# Patient Record
Sex: MALE | Race: WHITE | NOT HISPANIC OR LATINO | URBAN - METROPOLITAN AREA
[De-identification: names, ages, dates, MRNs, and addresses within clinical notes are randomized per-mention and may not be internally consistent; named-entity substitution may affect disease eponyms.]

---

## 2017-12-06 ENCOUNTER — ALLSCRIPTS OFFICE VISIT (OUTPATIENT)
Dept: OTHER | Facility: OTHER | Age: 57
End: 2017-12-06

## 2017-12-06 DIAGNOSIS — I10 ESSENTIAL (PRIMARY) HYPERTENSION: ICD-10-CM

## 2017-12-07 NOTE — PROGRESS NOTES
Assessment  Assessed    1  3-vessel CAD (414 00) (I25 10)   2  CABG   3  Hypertension (401 9) (I10)    Plan  Hyperlipidemia, Hypertension    · EKG/ECG- POC; Status:Complete;   Done: 90ZUP7653 09:12AM   Perform: In Office; Last Updated By:Manuela Harris; 2017 9:12:51 AM;Ordered;For:Hyperlipidemia, Hypertension; Ordered By:Rajesh Krishan; Hypertension    · Metoprolol Tartrate 25 MG Oral Tablet   Rx By: Cierra Palomino; Dispense: 30 Days ; #:180 Tablet; Refill: 2;For: Hypertension; XIOMY = N; Sent To: [facility]/PHARMACY #[id number]  · Carvedilol 12 5 MG Oral Tablet; TAKE 1 TABLET TWICE DAILY WITH MEALS   Rx By: Cierra Palomino; Dispense: 0 Days ; #:60 Tablet; Refill: 3;For: Hypertension; XIOMY = N; Verified Transmission to [facility]/PHARMACY #[id number] Last Updated By: System, SureScripts; 2017 9:40:33 AM   · HydroCHLOROthiazide 25 MG Oral Tablet; TAKE 1 TABLET DAILY   Rx By: Cierra Palomino; Dispense: 90 Days ; #:90 Tablet; Refill: 3;For: Hypertension; XIOMY = N; Record   · (1) COMPREHENSIVE METABOLIC PANEL; Status:Active; Requested for:22Pcy1946;    Perform:[facility]; Due:64Qzl7016; Ordered;Ordered By:Rajesh Krishna;   · (1) LIPID PANEL FASTING W DIRECT LDL REFLEX; Status:Active; Requestedfor:10Iri4609;    Perform:[facility]; [id number]; Ordered;Ordered By:Rajesh Krishna;   · Follow-up visit in 6 months Evaluation and Treatment  Follow-up  Status: Complete Done: 23PBY3387   Ordered; Hypertension; Ordered By: Cierra Palomino Performed:  Order Comments: put on w/l Due: 77FNR3523; Last Updated By: Inés Finn; 2017 9:32:07 AM    Discussion/Summary  Cardiology Discussion Summary Free Text Note Form [facility]:   1  Coronary Artery Disease s/p CABG: Overall doing well  Asymptomatic  Hypertension: BP running high  switch metoprolol to carvedilol  Dyslipidemia: Continue Atorvastatin     Counseling Documentation With Imm: The patient was counseled regarding diagnostic results,-- instructions for management,-- risk factor reductions,-- impressions  total time of encounter was 25 minutes-- and-- 15 minutes was spent counseling  Chief Complaint  Chief Complaint Free Text Note Form: yearly ov      History of Present Illness  Cardiology HPI Free Text Note Form [facility]: followup for cad  well  no chest pain, no dyspnea, no palpitations   Coronary Artery Disease (Follow-Up): The patient states he has been stable with his coronary artery disease symptoms since the last visit  Symptoms: denies chest pain when at rest,-- denies exertional chest pain,-- denies dyspnea,-- denies fatigue-- and-- denies exercise intolerance  Associated symptoms include no syncope,-- no palpitations,-- no PND,-- no orthopnea-- and-- no edema  He denies nitroglycerin use since the last visit  Review of Systems  Cardiology Male ROS:    Cardiac: no chest pain,-- no rhythm problems,-- no fainting/blackouts,-- no heart murmur present,-- no signs of swelling-- and-- no palpitations present  Skin: No complaints of nonhealing sores or skin rash  Genitourinary: No complaints of recurrent urinary tract infections, frequent urination at night, difficult urination, blood in urine, kidney stones, loss of bladder control, no kidney or prostate problems, no erectile dysfunction  Psychological: No complaints of feeling depressed, anxiety, panic attacks, or difficulty concentrating  General: No complaints of trouble sleeping, lack of energy, fatigue, appetite changes, weight changes, fever, frequent infections, or night sweats  Respiratory: No complaints of shortness of breath, cough with sputum, or wheezing  HEENT: No complaints of serious problems, hearing problems, nose problems, throat problems, or snoring  Gastrointestinal: No complaints of liver problems, nausea, vomiting, heartburn, constipation, bloody stools, diarrhea, problems swallowing, adbominal pain, or rectal bleeding    Hematologic: No complaints of bleeding disorders, anemia, blood clots, or excessive brusing  Neurological: No complaints of numbness, tingling, dizziness, weakness, seizures, headaches, syncope or fainting, AM fatigue, daytime sleepiness, no witnessed apnea episodes  Musculoskeletal: No complaints of arthritis, back pain, or painfull swelling  ROS Reviewed:   ROS reviewed  Active Problems  Problems    1  3-vessel CAD (414 00) (I25 10)   2  CABG   3  Cancer, colon (153 9) (C18 9)   4  Cerebrovascular accident, embolic (300 97) (Y37 8)   5  Hyperkalemia (276 7) (E87 5)   6  Hyperlipidemia (272 4) (E78 5)   7  Hypertension (401 9) (I10)    Past Medical History  Problems    1  History of myocardial infarction (412) (I25 2)  Active Problems And Past Medical History Reviewed: The active problems and past medical history were reviewed and updated today  Surgical History  Problems    1  History of CABG   2  CABG  Surgical History Reviewed: The surgical history was reviewed and updated today  Family History  Mother    1  Family history of    2  Family history of myocardial infarction (V17 3) (Z82 49)  Father    3  Family history of    4  Family history of cerebrovascular accident (V17 1) (Z82 3)  Family History Reviewed: The family history was reviewed and updated today  Social History  Problems    · Attends alcoholics anonymous meetings   · 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)   · Drug use (305 90) (F19 90)   · Never a smoker  Social History Reviewed: The social history was reviewed and updated today  Current Meds   1  Aspirin 81 MG TABS; TAKE 1 TABLET DAILY; Therapy: 48Hsk5149 to [phone])  Requested for: 74Pwz7341; Last Rx:79Msd4186 Ordered   2  Atorvastatin Calcium 80 MG Oral Tablet; TAKE 1 TABLET DAILY; Therapy: 72Uzy1870 to (Evaluate:08Xyw5945)  Requested for: 64Uyk2626; Last Rx:95Eux4557 Ordered   3  Candesartan Cilexetil 16 MG Oral Tablet; TAKE 1 TABLET ONCE DAILY; Therapy: 80Rkd0429 to  Requested for: 68UGY6057 Recorded   4   Centrum Silver TABS; Therapy: (Recorded:19Yei9635) to Recorded   5  HydroCHLOROthiazide 25 MG Oral Tablet; TAKE 1 TABLET DAILY; Therapy: 58OLX3161 to (Evaluate:97Lsh0168); Last Rx:96Hym9500 Ordered   6  Metoprolol Tartrate 25 MG Oral Tablet; TAKE 1 TABLET TWICE DAILY; Therapy: 97Rcy9548 to (Evaluate:90Qhs2537)  Requested for: 00Wxk7542; Last Rx:22Dev0350 Ordered   7  Nitrostat 0 4 MG Sublingual Tablet Sublingual; DISSOLVE 1 TABLET UNDER THE TONGUE AS NEEDED FOR CHEST PAIN; Therapy: 68ZLH4403 to (Evaluate:36Dkl8594)  Requested for: 96EXR5443; Last Rx:13Oct2014 Ordered  Medication List Reviewed: The medication list was reviewed and updated today  Allergies  Medication    1  Penicillins    Vitals  Vital Signs    Recorded: 92VUI8180 09:15AM   Heart Rate 44   Systolic 385   Diastolic 84   Weight 694 lb 8 oz   BMI Calculated 33 45   BSA Calculated 2 18       Physical Exam   Constitutional  General appearance: No acute distress, well appearing and well nourished  Eyes  Conjunctiva and Sclera examination: Conjunctiva pink, sclera anicteric  Ears, Nose, Mouth, and Throat - Oropharynx: Clear, nares are clear, mucous membranes are moist   Neck  Neck and thyroid: Normal, supple, trachea midline, no thyromegaly  Pulmonary  Respiratory effort: No increased work of breathing or signs of respiratory distress  Auscultation of lungs: Clear to auscultation, no rales, no rhonchi, no wheezing, good air movement  Cardiovascular  Auscultation of heart: Normal rate and rhythm, normal S1 and S2, no murmurs  Carotid pulses: Normal, 2+ bilaterally  Peripheral vascular exam: Normal pulses throughout, no tenderness, erythema or swelling  Pedal pulses: Normal, 2+ bilaterally  Examination of extremities for edema and/or varicosities: Normal    Abdomen  Abdomen: Non-tender and no distention  Liver and spleen: No hepatomegaly or splenomegaly  Musculoskeletal Gait and station: Normal gait  -- Digits and nails: Normal without clubbing or cyanosis  -- Inspection/palpation of joints, bones, and muscles: Normal, ROM normal    Skin - Skin and subcutaneous tissue: Normal without rashes or lesions  Skin is warm and well perfused, normal turgor  Neurologic - Speech: Normal    Psychiatric - Orientation to person, place, and time: Normal -- Mood and affect: Normal       Results/Data  ECG Report:  Rhythm and rate: sinus bradycardia--   ventricular rate is 44 beats per minute    ST segment: the ST segments are normal       Signatures   Electronically signed by : BIB Dalton ; Dec  6 2017  7:49PM EST                       (Author)

## 2018-01-22 VITALS
SYSTOLIC BLOOD PRESSURE: 148 MMHG | WEIGHT: 226.5 LBS | BODY MASS INDEX: 33.45 KG/M2 | DIASTOLIC BLOOD PRESSURE: 84 MMHG | HEART RATE: 44 BPM

## 2018-01-23 NOTE — CONSULTS
Chief Complaint  yearly ov      History of Present Illness  followup for cad    doing well  no chest pain, no dyspnea, no palpitations   The patient states he has been stable with his coronary artery disease symptoms since the last visit  Symptoms: denies chest pain when at rest, denies exertional chest pain, denies dyspnea, denies fatigue and denies exercise intolerance  Associated symptoms include no syncope, no palpitations, no PND, no orthopnea and no edema  He denies nitroglycerin use since the last visit  Review of Systems      Cardiac: no chest pain, no rhythm problems, no fainting/blackouts, no heart murmur present, no signs of swelling and no palpitations present  Skin: No complaints of nonhealing sores or skin rash  Genitourinary: No complaints of recurrent urinary tract infections, frequent urination at night, difficult urination, blood in urine, kidney stones, loss of bladder control, no kidney or prostate problems, no erectile dysfunction  Psychological: No complaints of feeling depressed, anxiety, panic attacks, or difficulty concentrating  General: No complaints of trouble sleeping, lack of energy, fatigue, appetite changes, weight changes, fever, frequent infections, or night sweats  Respiratory: No complaints of shortness of breath, cough with sputum, or wheezing  HEENT: No complaints of serious problems, hearing problems, nose problems, throat problems, or snoring  Gastrointestinal: No complaints of liver problems, nausea, vomiting, heartburn, constipation, bloody stools, diarrhea, problems swallowing, adbominal pain, or rectal bleeding  Hematologic: No complaints of bleeding disorders, anemia, blood clots, or excessive brusing  Neurological: No complaints of numbness, tingling, dizziness, weakness, seizures, headaches, syncope or fainting, AM fatigue, daytime sleepiness, no witnessed apnea episodes     Musculoskeletal: No complaints of arthritis, back pain, or painfull swelling  ROS reviewed  Active Problems    1  3-vessel CAD (414 00) (I25 10)   2  CABG   3  Cancer, colon (153 9) (C18 9)   4  Cerebrovascular accident, embolic (484 51) (X42 3)   5  Hyperkalemia (276 7) (E87 5)   6  Hyperlipidemia (272 4) (E78 5)   7  Hypertension (401 9) (I10)    Past Medical History    · History of myocardial infarction (412) (I25 2)    The active problems and past medical history were reviewed and updated today  Surgical History    · History of CABG   · CABG    The surgical history was reviewed and updated today  Family History    · Family history of    · Family history of myocardial infarction (V17 3) (Z82 49)    · Family history of    · Family history of cerebrovascular accident (V17 1) (Z82 3)    The family history was reviewed and updated today  Social History    · Attends alcoholics anonymous meetings   · 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)   · Drug use (305 90) (F19 90)   · Never a smoker  The social history was reviewed and updated today  Current Meds   1  Aspirin 81 MG TABS; TAKE 1 TABLET DAILY; Therapy: 68Ynj9766 to (96 418998)  Requested for: 37Hcj1392; Last   Rx:61Ufo8436 Ordered   2  Atorvastatin Calcium 80 MG Oral Tablet; TAKE 1 TABLET DAILY; Therapy: 92Nld5453 to (Evaluate:44Hpd5503)  Requested for: 95Ost1838; Last   Rx:00Jvq0937 Ordered   3  Candesartan Cilexetil 16 MG Oral Tablet; TAKE 1 TABLET ONCE DAILY; Therapy: 09Bah8888 to  Requested for: 54GWO6011 Recorded   4  Centrum Silver TABS; Therapy: (Recorded:85Ajc5516) to Recorded   5  HydroCHLOROthiazide 25 MG Oral Tablet; TAKE 1 TABLET DAILY; Therapy: 08SQG4107 to (Evaluate:30Bqq0927); Last Rx:90Chs0931 Ordered   6  Metoprolol Tartrate 25 MG Oral Tablet; TAKE 1 TABLET TWICE DAILY; Therapy: 15Nlt3194 to (Evaluate:2016)  Requested for: 25Ypd3969; Last   Rx:54Dcf4874 Ordered   7   Nitrostat 0 4 MG Sublingual Tablet Sublingual; DISSOLVE 1 TABLET UNDER THE   TONGUE AS NEEDED FOR CHEST PAIN;   Therapy: 15DKJ6164 to (Evaluate:19Xzo1251)  Requested for: 14KOR0395; Last   Rx:13Oct2014 Ordered    The medication list was reviewed and updated today  Allergies    1  Penicillins    Vitals   Recorded: 09AGX7674 09:15AM   Heart Rate 44   Systolic 566   Diastolic 84   Weight 717 lb 8 oz   BMI Calculated 33 45   BSA Calculated 2 18     Physical Exam    Constitutional   General appearance: No acute distress, well appearing and well nourished  Eyes   Conjunctiva and Sclera examination: Conjunctiva pink, sclera anicteric  Ears, Nose, Mouth, and Throat - Oropharynx: Clear, nares are clear, mucous membranes are moist    Neck   Neck and thyroid: Normal, supple, trachea midline, no thyromegaly  Pulmonary   Respiratory effort: No increased work of breathing or signs of respiratory distress  Auscultation of lungs: Clear to auscultation, no rales, no rhonchi, no wheezing, good air movement  Cardiovascular   Auscultation of heart: Normal rate and rhythm, normal S1 and S2, no murmurs  Carotid pulses: Normal, 2+ bilaterally  Peripheral vascular exam: Normal pulses throughout, no tenderness, erythema or swelling  Pedal pulses: Normal, 2+ bilaterally  Examination of extremities for edema and/or varicosities: Normal     Abdomen   Abdomen: Non-tender and no distention  Liver and spleen: No hepatomegaly or splenomegaly  Musculoskeletal Gait and station: Normal gait  Digits and nails: Normal without clubbing or cyanosis  Inspection/palpation of joints, bones, and muscles: Normal, ROM normal     Skin - Skin and subcutaneous tissue: Normal without rashes or lesions  Skin is warm and well perfused, normal turgor  Neurologic - Speech: Normal     Psychiatric - Orientation to person, place, and time: Normal  Mood and affect: Normal       Results/Data    Rhythm and rate: sinus bradycardia   ventricular rate is 44 beats per minute     ST segment: the ST segments are normal  Assessment    1  3-vessel CAD (414 00) (I25 10)   2  CABG   3  Hypertension (401 9) (I10)    Plan  Hyperlipidemia, Hypertension    · EKG/ECG- POC; Status:Complete;   Done: 17UNH0863 09:12AM   Perform: In Office; Last Updated By:Manuela Harris; 12/6/2017 9:12:51 AM;Ordered;  For:Hyperlipidemia, Hypertension; Ordered By:Joel Krishna; Hypertension    · Metoprolol Tartrate 25 MG Oral Tablet   Rx By: Laney Moser; Dispense: 30 Days ; #:180 Tablet; Refill: 2; For: Hypertension; XIOMY = N; Sent To: [facility]/PHARMACY #1341  · Carvedilol 12 5 MG Oral Tablet; TAKE 1 TABLET TWICE DAILY WITH MEALS   Rx By: Laney Moser; Dispense: 0 Days ; #:60 Tablet; Refill: 3; For: Hypertension; XIOMY = N; Verified Transmission to [facility]/PHARMACY #0354 Last Updated By: System, SureScripts; 12/6/2017 9:40:33 AM   · HydroCHLOROthiazide 25 MG Oral Tablet; TAKE 1 TABLET DAILY   Rx By: Laney Moser; Dispense: 90 Days ; #:90 Tablet; Refill: 3; For: Hypertension; XIOMY = N; Record   · (1) COMPREHENSIVE METABOLIC PANEL; Status:Active; Requested for:18Xeo3686;    Perform:[facility]; Due:72Hea1182; Ordered;  For:Hypertension; Ordered By:Khris Krishna;   · (1) LIPID PANEL FASTING W DIRECT LDL REFLEX; Status:Active; Requested  for:51Hzc9984;    Perform:[facility]; BIW:02PKU8480; Ordered;  For:Hypertension; Ordered By:Khris Krishna;   · Follow-up visit in 6 months Evaluation and Treatment  Follow-up  Status: Complete   Done: 66GSE5019   Ordered; For: Hypertension; Ordered By: Laney Mosre Performed:  Order Comments: put on w/l Due: 83SSA5872; Last Updated By: Jerilyn Bonner; 12/6/2017 9:32:07 AM    Discussion/Summary    1  Coronary Artery Disease s/p CABG: Overall doing well  Asymptomatic  2  Hypertension: BP running high  switch metoprolol to carvedilol    3  Dyslipidemia: Continue Atorvastatin  The patient was counseled regarding diagnostic results, instructions for management, risk factor reductions, impressions   total time of encounter was 25 minutes and 15 minutes was spent counseling        Signatures   Electronically signed by : BIB Drake ; Dec  6 2017  7:49PM EST                       (Author)

## 2018-03-30 DIAGNOSIS — I10 ESSENTIAL HYPERTENSION: Primary | ICD-10-CM

## 2018-03-30 RX ORDER — CARVEDILOL 12.5 MG/1
TABLET ORAL
Qty: 60 TABLET | Refills: 3 | Status: SHIPPED | OUTPATIENT
Start: 2018-03-30 | End: 2018-07-16 | Stop reason: SDUPTHER

## 2018-07-16 DIAGNOSIS — I10 ESSENTIAL HYPERTENSION: ICD-10-CM

## 2018-07-16 RX ORDER — CARVEDILOL 12.5 MG/1
TABLET ORAL
Qty: 60 TABLET | Refills: 3 | Status: SHIPPED | OUTPATIENT
Start: 2018-07-16

## 2018-12-06 DIAGNOSIS — I10 ESSENTIAL HYPERTENSION: Primary | ICD-10-CM

## 2018-12-06 RX ORDER — HYDROCHLOROTHIAZIDE 25 MG/1
TABLET ORAL
Qty: 90 TABLET | Refills: 3 | Status: SHIPPED | OUTPATIENT
Start: 2018-12-06 | End: 2019-12-08 | Stop reason: SDUPTHER

## 2019-12-08 DIAGNOSIS — I10 ESSENTIAL HYPERTENSION: ICD-10-CM

## 2019-12-09 RX ORDER — HYDROCHLOROTHIAZIDE 25 MG/1
TABLET ORAL
Qty: 90 TABLET | Refills: 3 | Status: SHIPPED | OUTPATIENT
Start: 2019-12-09

## 2024-03-05 ENCOUNTER — HOSPITAL ENCOUNTER (EMERGENCY)
Facility: HOSPITAL | Age: 64
Discharge: HOME | End: 2024-03-05
Attending: EMERGENCY MEDICINE
Payer: COMMERCIAL

## 2024-03-05 VITALS
DIASTOLIC BLOOD PRESSURE: 78 MMHG | HEART RATE: 47 BPM | RESPIRATION RATE: 12 BRPM | BODY MASS INDEX: 31.04 KG/M2 | SYSTOLIC BLOOD PRESSURE: 150 MMHG | OXYGEN SATURATION: 97 % | WEIGHT: 210.2 LBS | TEMPERATURE: 98.4 F

## 2024-03-05 DIAGNOSIS — I10 ASYMPTOMATIC HYPERTENSION: Primary | ICD-10-CM

## 2024-03-05 LAB
ANION GAP SERPL CALCULATED.3IONS-SCNC: 5 MMOL/L
BASOPHILS # BLD AUTO: 0.04 THOUSANDS/ÂΜL (ref 0–0.1)
BASOPHILS NFR BLD AUTO: 1 % (ref 0–1)
BUN SERPL-MCNC: 14 MG/DL (ref 5–25)
CALCIUM SERPL-MCNC: 9.5 MG/DL (ref 8.4–10.2)
CHLORIDE SERPL-SCNC: 105 MMOL/L (ref 96–108)
CO2 SERPL-SCNC: 28 MMOL/L (ref 21–32)
CREAT SERPL-MCNC: 0.99 MG/DL (ref 0.6–1.3)
EOSINOPHIL # BLD AUTO: 0.29 THOUSAND/ÂΜL (ref 0–0.61)
EOSINOPHIL NFR BLD AUTO: 4 % (ref 0–6)
ERYTHROCYTE [DISTWIDTH] IN BLOOD BY AUTOMATED COUNT: 12.7 % (ref 11.6–15.1)
GFR SERPL CREATININE-BSD FRML MDRD: 79 ML/MIN/1.73SQ M
GLUCOSE SERPL-MCNC: 95 MG/DL (ref 65–140)
HCT VFR BLD AUTO: 44.9 % (ref 36.5–49.3)
HGB BLD-MCNC: 14.2 G/DL (ref 12–17)
IMM GRANULOCYTES # BLD AUTO: 0.03 THOUSAND/UL (ref 0–0.2)
IMM GRANULOCYTES NFR BLD AUTO: 0 % (ref 0–2)
LYMPHOCYTES # BLD AUTO: 0.69 THOUSANDS/ÂΜL (ref 0.6–4.47)
LYMPHOCYTES NFR BLD AUTO: 10 % (ref 14–44)
MCH RBC QN AUTO: 29.8 PG (ref 26.8–34.3)
MCHC RBC AUTO-ENTMCNC: 31.6 G/DL (ref 31.4–37.4)
MCV RBC AUTO: 94 FL (ref 82–98)
MONOCYTES # BLD AUTO: 0.7 THOUSAND/ÂΜL (ref 0.17–1.22)
MONOCYTES NFR BLD AUTO: 10 % (ref 4–12)
NEUTROPHILS # BLD AUTO: 5.35 THOUSANDS/ÂΜL (ref 1.85–7.62)
NEUTS SEG NFR BLD AUTO: 75 % (ref 43–75)
NRBC BLD AUTO-RTO: 0 /100 WBCS
PLATELET # BLD AUTO: 225 THOUSANDS/UL (ref 149–390)
PMV BLD AUTO: 11.7 FL (ref 8.9–12.7)
POTASSIUM SERPL-SCNC: 4.4 MMOL/L (ref 3.5–5.3)
RBC # BLD AUTO: 4.76 MILLION/UL (ref 3.88–5.62)
SODIUM SERPL-SCNC: 138 MMOL/L (ref 135–147)
WBC # BLD AUTO: 7.1 THOUSAND/UL (ref 4.31–10.16)

## 2024-03-05 PROCEDURE — 99284 EMERGENCY DEPT VISIT MOD MDM: CPT | Performed by: EMERGENCY MEDICINE

## 2024-03-05 PROCEDURE — 80048 BASIC METABOLIC PNL TOTAL CA: CPT | Performed by: EMERGENCY MEDICINE

## 2024-03-05 PROCEDURE — 36415 COLL VENOUS BLD VENIPUNCTURE: CPT | Performed by: EMERGENCY MEDICINE

## 2024-03-05 PROCEDURE — 85025 COMPLETE CBC W/AUTO DIFF WBC: CPT | Performed by: EMERGENCY MEDICINE

## 2024-03-05 PROCEDURE — 99283 EMERGENCY DEPT VISIT LOW MDM: CPT

## 2024-03-05 PROCEDURE — 93005 ELECTROCARDIOGRAM TRACING: CPT

## 2024-03-05 NOTE — ED PROVIDER NOTES
History  Chief Complaint   Patient presents with    Hypertension     Pt c/o hypertension. Pt states he woke up with a nose bleed and hypertension. Pt states he had a TIA x 2 weeks ago. Pt denies cp/sob/n/v/d or fevers. No bleeding noted in triage     Matt is a 64 y/o M with PMH of TIA presenting to the ER with the c/o high blood pressure and bloody nose this morning. Pt notes the minor nose bleed woke him up this morning. His BP at the time was 160 systolic / unknown diastolic, which he states is not abnormal for him. Since the TIA 3 weeks ago, his BP has been hovering around 180 systolic / 80s diastolic. He states the nose bleed came back within the hour after showering, and his BP was then 208 / 90. He is on ASA and Plavix (Plavix just recently added 3 weeks ago after stroke) . Nose not currently bleeding since arrival to ER. Denies HA, lightheadedness, diaphoresis, blurry vision, chest pain, SOB, abdominal pain, N/V. Denies bleeding from other sites.         Prior to Admission Medications   Prescriptions Last Dose Informant Patient Reported? Taking?   carvedilol (COREG) 12.5 mg tablet   No No   Sig: TAKE 1 TABLET TWICE DAILY WITH MEALS.   hydrochlorothiazide (HYDRODIURIL) 25 mg tablet   No No   Sig: TAKE 1 TABLET EVERY DAY      Facility-Administered Medications: None       Past Medical History:   Diagnosis Date    TIA (transient ischemic attack) 02/13/2024       Past Surgical History:   Procedure Laterality Date    BYPASS GRAFT  2014       History reviewed. No pertinent family history.  I have reviewed and agree with the history as documented.    E-Cigarette/Vaping    E-Cigarette Use Never User      E-Cigarette/Vaping Substances     Social History     Tobacco Use    Smoking status: Never    Smokeless tobacco: Never   Vaping Use    Vaping status: Never Used   Substance Use Topics    Alcohol use: Not Currently    Drug use: Never       Review of Systems   Constitutional:  Negative for chills, diaphoresis, fatigue  and fever.   HENT:  Positive for nosebleeds. Negative for congestion and rhinorrhea.    Eyes:  Negative for pain and visual disturbance.   Respiratory:  Negative for cough and shortness of breath.    Cardiovascular:  Negative for chest pain, palpitations and leg swelling.   Gastrointestinal:  Negative for abdominal pain, nausea and vomiting.   Genitourinary:  Negative for difficulty urinating, dysuria and hematuria.   Musculoskeletal:  Negative for back pain and neck pain.   Skin:  Negative for color change, pallor and rash.   Neurological:  Negative for dizziness, syncope, light-headedness, numbness and headaches.   All other systems reviewed and are negative.      Physical Exam  Physical Exam  Vitals and nursing note reviewed.   Constitutional:       General: He is not in acute distress.     Appearance: Normal appearance. He is well-developed. He is not ill-appearing, toxic-appearing or diaphoretic.   HENT:      Head: Normocephalic and atraumatic.      Nose:      Comments: Nose is not actively bleeding at time of examination.  Posterior oropharynx clear moist and symmetrical     Mouth/Throat:      Mouth: Mucous membranes are moist.   Eyes:      Conjunctiva/sclera: Conjunctivae normal.   Cardiovascular:      Rate and Rhythm: Normal rate and regular rhythm.   Pulmonary:      Effort: Pulmonary effort is normal. No respiratory distress.   Abdominal:      General: Abdomen is flat. Bowel sounds are normal.      Palpations: Abdomen is soft.      Tenderness: There is no abdominal tenderness.   Musculoskeletal:         General: No tenderness. Normal range of motion.      Cervical back: Normal range of motion and neck supple.   Skin:     General: Skin is warm.      Capillary Refill: Capillary refill takes less than 2 seconds.      Coloration: Skin is not jaundiced or pale.      Findings: No bruising or erythema.   Neurological:      Mental Status: He is alert and oriented to person, place, and time.      GCS: GCS eye  subscore is 4. GCS verbal subscore is 5. GCS motor subscore is 6.   Psychiatric:         Behavior: Behavior normal. Behavior is cooperative.         Vital Signs  ED Triage Vitals [03/05/24 0919]   Temperature Pulse Respirations Blood Pressure SpO2   98.4 °F (36.9 °C) 55 18 (!) 191/103 99 %      Temp Source Heart Rate Source Patient Position - Orthostatic VS BP Location FiO2 (%)   Oral Monitor Sitting Right arm --      Pain Score       No Pain           Vitals:    03/05/24 0930 03/05/24 0945 03/05/24 1000 03/05/24 1024   BP: (!) 212/102 (!) 185/96 150/78 150/78   Pulse: (!) 53 (!) 51 (!) 47    Patient Position - Orthostatic VS:             Visual Acuity      ED Medications  Medications - No data to display    Diagnostic Studies  Results Reviewed       Procedure Component Value Units Date/Time    Basic metabolic panel [[id number]] Collected: 03/05/24 0956    Lab Status: Final result Specimen: Blood from Arm, Left Updated: 03/05/24 1021     Sodium 138 mmol/L      Potassium 4.4 mmol/L      Chloride 105 mmol/L      CO2 28 mmol/L      ANION GAP 5 mmol/L      BUN 14 mg/dL      Creatinine 0.99 mg/dL      Glucose 95 mg/dL      Calcium 9.5 mg/dL      eGFR 79 ml/min/1.73sq m     Narrative:      [employer] guidelines for Chronic Kidney Disease (CKD):     Stage 1 with normal or high GFR (GFR > 90 mL/min/1.73 square meters)    Stage 2 Mild CKD (GFR = 60-89 mL/min/1.73 square meters)    Stage 3A Moderate CKD (GFR = 45-59 mL/min/1.73 square meters)    Stage 3B Moderate CKD (GFR = 30-44 mL/min/1.73 square meters)    Stage 4 Severe CKD (GFR = 15-29 mL/min/1.73 square meters)    Stage 5 End Stage CKD (GFR <15 mL/min/1.73 square meters)  Note: GFR calculation is accurate only with a steady state creatinine    CBC and differential [[id number]]  (Abnormal) Collected: 03/05/24 0956    Lab Status: Final result Specimen: Blood from Arm, Left Updated: 03/05/24 1006     WBC 7.10 Thousand/uL      RBC 4.76 Million/uL       Hemoglobin 14.2 g/dL      Hematocrit 44.9 %      MCV 94 fL      MCH 29.8 pg      MCHC 31.6 g/dL      RDW 12.7 %      MPV 11.7 fL      Platelets 225 Thousands/uL      nRBC 0 /100 WBCs      Neutrophils Relative 75 %      Immat GRANS % 0 %      Lymphocytes Relative 10 %      Monocytes Relative 10 %      Eosinophils Relative 4 %      Basophils Relative 1 %      Neutrophils Absolute 5.35 Thousands/µL      Immature Grans Absolute 0.03 Thousand/uL      Lymphocytes Absolute 0.69 Thousands/µL      Monocytes Absolute 0.70 Thousand/µL      Eosinophils Absolute 0.29 Thousand/µL      Basophils Absolute 0.04 Thousands/µL                    No orders to display              Procedures  Procedures         ED Course                                             Medical Decision Making  63-year-old male with minor nosebleed and concern for hypertension.  Labs to assess for endorgan damage although low concern.  Patient's blood pressure prior to discharge of 150/78.    Amount and/or Complexity of Data Reviewed  Labs: ordered.             Disposition  Final diagnoses:   Asymptomatic hypertension     Time reflects when diagnosis was documented in both MDM as applicable and the Disposition within this note       Time User Action Codes Description Comment    3/5/2024 10:23 AM Arnav Gant Add [I10] Asymptomatic hypertension           ED Disposition       ED Disposition   Discharge    Condition   Stable    Date/Time   Tue Mar 5, 2024 10:23 AM    Comment   Matt Oliva discharge to home/self care.                   Follow-up Information       Follow up With Specialties Details Why Contact Info Additional Information    [facility]   [address] [facility]  [address]  [facility] [address]  [phone]        [facility] Emergency Department Emergency Medicine  If symptoms worsen [address]  [facility] [address] [phone] [facility] Emergency Department, [address]  [place], [place] [address]            Discharge Medication List as of 3/5/2024 10:23 AM        CONTINUE these medications which have NOT CHANGED    Details   carvedilol (COREG) 12.5 mg tablet TAKE 1 TABLET TWICE DAILY WITH MEALS., Normal      hydrochlorothiazide (HYDRODIURIL) 25 mg tablet TAKE 1 TABLET EVERY DAY, Normal             No discharge procedures on file.    PDMP Review       None            ED Provider  Electronically Signed by             Arnav Gant DO  03/05/24 4805

## 2024-03-10 LAB
ATRIAL RATE: 51 BPM
P AXIS: 53 DEGREES
PR INTERVAL: 176 MS
QRS AXIS: 47 DEGREES
QRSD INTERVAL: 94 MS
QT INTERVAL: 410 MS
QTC INTERVAL: 377 MS
T WAVE AXIS: 11 DEGREES
VENTRICULAR RATE: 51 BPM

## 2024-03-10 PROCEDURE — 93010 ELECTROCARDIOGRAM REPORT: CPT | Performed by: INTERNAL MEDICINE
